# Patient Record
Sex: FEMALE | Race: WHITE
[De-identification: names, ages, dates, MRNs, and addresses within clinical notes are randomized per-mention and may not be internally consistent; named-entity substitution may affect disease eponyms.]

---

## 2017-02-14 ENCOUNTER — HOSPITAL ENCOUNTER (INPATIENT)
Dept: HOSPITAL 62 - ER | Age: 72
LOS: 1 days | Discharge: HOME | DRG: 192 | End: 2017-02-15
Attending: INTERNAL MEDICINE | Admitting: INTERNAL MEDICINE
Payer: MEDICARE

## 2017-02-14 DIAGNOSIS — F17.210: ICD-10-CM

## 2017-02-14 DIAGNOSIS — R09.02: ICD-10-CM

## 2017-02-14 DIAGNOSIS — J45.909: ICD-10-CM

## 2017-02-14 DIAGNOSIS — D50.9: ICD-10-CM

## 2017-02-14 DIAGNOSIS — Z90.710: ICD-10-CM

## 2017-02-14 DIAGNOSIS — D11.9: ICD-10-CM

## 2017-02-14 DIAGNOSIS — Z99.81: ICD-10-CM

## 2017-02-14 DIAGNOSIS — Z83.3: ICD-10-CM

## 2017-02-14 DIAGNOSIS — J44.1: Primary | ICD-10-CM

## 2017-02-14 DIAGNOSIS — Z88.1: ICD-10-CM

## 2017-02-14 DIAGNOSIS — Z90.49: ICD-10-CM

## 2017-02-14 DIAGNOSIS — M19.90: ICD-10-CM

## 2017-02-14 LAB
BASOPHILS # BLD AUTO: 0 10^3/UL (ref 0–0.2)
BASOPHILS NFR BLD AUTO: 0.4 % (ref 0–2)
EOSINOPHIL # BLD AUTO: 0.1 10^3/UL (ref 0–0.6)
EOSINOPHIL NFR BLD AUTO: 0.8 % (ref 0–6)
ERYTHROCYTE [DISTWIDTH] IN BLOOD BY AUTOMATED COUNT: 20.3 % (ref 11.5–14)
HCT VFR BLD CALC: 30.5 % (ref 36–47)
HGB BLD-MCNC: 9.4 G/DL (ref 12–15.5)
HGB HCT DIFFERENCE: -2.3
LYMPHOCYTES # BLD AUTO: 1.3 10^3/UL (ref 0.5–4.7)
LYMPHOCYTES NFR BLD AUTO: 15.3 % (ref 13–45)
MCH RBC QN AUTO: 21.2 PG (ref 27–33.4)
MCHC RBC AUTO-ENTMCNC: 30.9 G/DL (ref 32–36)
MCV RBC AUTO: 69 FL (ref 80–97)
MONOCYTES # BLD AUTO: 0.5 10^3/UL (ref 0.1–1.4)
MONOCYTES NFR BLD AUTO: 6.1 % (ref 3–13)
NEUTROPHILS # BLD AUTO: 6.6 10^3/UL (ref 1.7–8.2)
NEUTS SEG NFR BLD AUTO: 77.4 % (ref 42–78)
RBC # BLD AUTO: 4.45 10^6/UL (ref 3.72–5.28)
WBC # BLD AUTO: 8.5 10^3/UL (ref 4–10.5)

## 2017-02-14 PROCEDURE — 94660 CPAP INITIATION&MGMT: CPT

## 2017-02-14 PROCEDURE — 93010 ELECTROCARDIOGRAM REPORT: CPT

## 2017-02-14 PROCEDURE — 83880 ASSAY OF NATRIURETIC PEPTIDE: CPT

## 2017-02-14 PROCEDURE — 85045 AUTOMATED RETICULOCYTE COUNT: CPT

## 2017-02-14 PROCEDURE — 82728 ASSAY OF FERRITIN: CPT

## 2017-02-14 PROCEDURE — 80053 COMPREHEN METABOLIC PANEL: CPT

## 2017-02-14 PROCEDURE — 80048 BASIC METABOLIC PNL TOTAL CA: CPT

## 2017-02-14 PROCEDURE — 94799 UNLISTED PULMONARY SVC/PX: CPT

## 2017-02-14 PROCEDURE — 85610 PROTHROMBIN TIME: CPT

## 2017-02-14 PROCEDURE — 36415 COLL VENOUS BLD VENIPUNCTURE: CPT

## 2017-02-14 PROCEDURE — 71010: CPT

## 2017-02-14 PROCEDURE — 82803 BLOOD GASES ANY COMBINATION: CPT

## 2017-02-14 PROCEDURE — 94640 AIRWAY INHALATION TREATMENT: CPT

## 2017-02-14 PROCEDURE — 82962 GLUCOSE BLOOD TEST: CPT

## 2017-02-14 PROCEDURE — 83540 ASSAY OF IRON: CPT

## 2017-02-14 PROCEDURE — 87040 BLOOD CULTURE FOR BACTERIA: CPT

## 2017-02-14 PROCEDURE — 82607 VITAMIN B-12: CPT

## 2017-02-14 PROCEDURE — 93005 ELECTROCARDIOGRAM TRACING: CPT

## 2017-02-14 PROCEDURE — 87086 URINE CULTURE/COLONY COUNT: CPT

## 2017-02-14 PROCEDURE — 96365 THER/PROPH/DIAG IV INF INIT: CPT

## 2017-02-14 PROCEDURE — 83550 IRON BINDING TEST: CPT

## 2017-02-14 PROCEDURE — 83605 ASSAY OF LACTIC ACID: CPT

## 2017-02-14 PROCEDURE — 99291 CRITICAL CARE FIRST HOUR: CPT

## 2017-02-14 PROCEDURE — 82746 ASSAY OF FOLIC ACID SERUM: CPT

## 2017-02-14 PROCEDURE — 84484 ASSAY OF TROPONIN QUANT: CPT

## 2017-02-14 PROCEDURE — 81001 URINALYSIS AUTO W/SCOPE: CPT

## 2017-02-14 PROCEDURE — 85025 COMPLETE CBC W/AUTO DIFF WBC: CPT

## 2017-02-14 NOTE — EKG REPORT
SEVERITY:- ABNORMAL ECG -

SINUS RHYTHM

BORDERLINE LEFT AXIS DEVIATION

CONSIDER ANTEROSEPTAL INFARCT

BORDERLINE PROLONGED QT INTERVAL

:

Confirmed by: James Lopez 14-Feb-2017 23:48:21

## 2017-02-14 NOTE — ER DOCUMENT REPORT
ED Respiratory Problem





- General


Mode of Arrival: Medic


Information source: Patient, Emergency Med Personnel


TRAVEL OUTSIDE OF THE [place] IN LAST 30 DAYS: No





- HPI


Patient complains to provider of: Short of breath


Onset: Last week


Duration: Worse/persistent


Context: Hx COPD


Short of Breath: Mild


Associated symptoms: Fever, Sore Throat, Other - Nasal congestion


Similar symptoms previously: Yes


Recently seen / treated by doctor: Yes





<BRUCE CRESPO - Last Filed: 02/15/17 00:10>





<TERRY YOST - Last Filed: 02/15/17 05:23>





- General


Stated Complaint: DIFFICULTY BREATHING


Notes: 


Patient is a 71-year-old female that presents to the emergency department today 

with complaints of shortness of breath 1 week.  Patient went to an urgent care 

today for shortness of breath and was started on prednisone and azithromycin.  

Patient states her shortness of breath is much worse with exertion.  Patient 

states she is on 2L of home O2 daily.  EMS reports on arrival, the patient had 

an oxygen saturation 92%.  EMS administered 1 duoneb and 1 Solu-Medrol in 

route.  Patient states she had a "low-grade fever" at home along with nasal 

congestion and a sore throat. Patient is in mild respiratory distress. (BRUCE CRESPO)





- Related Data


Allergies/Adverse Reactions: 


 





ciprofloxacin [From Cipro] Allergy (Intermediate, Verified 02/15/17 00:27)


 


ciprofloxacin HCl [From Cipro] Allergy (Intermediate, Verified 02/15/17 00:27)


 


clindamycin [Clindamycin] Allergy (Intermediate, Verified 02/15/17 00:27)


 











Past Medical History





- General


Information source: Patient, [facility] Records





- Social History


Smoking Status: Current Every Day Smoker


Cigarette use (# per day): Yes


Frequency of alcohol use: None


Drug Abuse: None


Lives with: Family


Family History: Reviewed & Not Pertinent, DM


Pulmonary Medical History: Reports: Hx Asthma, Hx COPD, Hx Pneumonia


Endocrine Medical History: Reports: Hx Diabetes Mellitus Type 2


Musculoskeltal Medical History: Reports Hx Arthritis


Psychiatric Medical History: Reports: Hx Depression


Past Surgical History: Reports: Hx Abdominal Surgery - lap band, bowel resection

, Hx Appendectomy, Hx Breast Surgery - reduction, Hx Cholecystectomy, Hx 

Hysterectomy, Hx Thyroid Surgery - thyroidectomy





- Immunizations


Hx Diphtheria, Pertussis, Tetanus Vaccination: Yes - 09/01/13


Hx Pneumococcal Vaccination: 09/01/13





<BRUCE CRESPO - Last Filed: 02/15/17 00:10>





Review of Systems





- Review of Systems


Constitutional: See HPI, Fever


EENT: See HPI, Nose congestion, Throat pain


Cardiovascular: No symptoms reported


Respiratory: See HPI, Short of breath


Gastrointestinal: No symptoms reported


Genitourinary: No symptoms reported


Female Genitourinary: No symptoms reported


Musculoskeletal: No symptoms reported


Skin: No symptoms reported


Hematologic/Lymphatic: No symptoms reported


Neurological/Psychological: No symptoms reported


-: Yes All other systems reviewed and negative





<BRUCE CRESPO - Last Filed: 02/15/17 00:10>





Physical Exam





- Vital signs


Interpretation: Tachycardic





<BRUCE CRESPO - Last Filed: 02/15/17 00:10>





<TERRY YOST - Last Filed: 02/15/17 05:23>





- Vital signs


Vitals: 


 











Temp


 


 99.4 F 


 


 02/14/17 23:20











 (TERRY YOST)





- Notes


Notes: 


Physical Exam:


 


General: Alert, appears to be in mild distress secondary to shortness of breath.


 


HEENT: Normocephalic. Atraumatic. PERRL. Extraocular movements intact. 

Oropharynx clear.


 


Neck: Supple. Non-tender.


 


Respiratory: Mild respiratory distress.  Wheezing throughout.  Mild 

retractions.  Decreased breath sounds at the bases bilaterally.


 


Cardiovascular: Tachycardic, regular rhythm.


 


Abdominal: Normal Inspection. Non-tender. No distension. Normal Bowel Sounds. 


 


Back: Non-tender. No deformity or step off.


 


Extremities: Moves all four extremities.


Upper extremities: Normal inspection. Normal ROM.


Lower extremities: Normal inspection. No edema. Normal ROM. 


 


Neurological: Normal cognition. AAOx4. Normal speech.  


 


Psychological: Normal affect. Normal Mood. 


 


Skin: Warm. Dry. Normal color.  (BRUCE CRESPO)





Course





- Laboratory


Result Diagrams: 


 02/14/17 23:40





 02/14/17 23:40





<BRUCE CRESPO - Last Filed: 02/15/17 00:10>





- Laboratory


Result Diagrams: 


 02/14/17 23:40





 02/14/17 23:40





<TERRY YOST - Last Filed: 02/15/17 05:23>





- Re-evaluation


Re-evalutation: 





02/15/17 


Patient with COPD exacerbation, hypoxia, a right lower lobe pneumonia.  

Continues to wheeze despite DuoNeb, Solu-Medrol, and magnesium.  Has failed 

outpatient treatment with azithromycin.  Allergy to Levaquin.  Patient given 

cefepime and doxycycline.  Blood culture sent.  Patient will be admitted to the 

hospital service.  Discussed with daughter who agrees with this plan.  Stable 

time of admission. (TERRY YOST)





- Vital Signs


Vital signs: 


 











Temp Pulse Resp BP Pulse Ox


 


 98.0 F   96   25 H  114/65   95 


 


 02/15/17 04:04  02/15/17 04:04  02/15/17 04:04  02/15/17 04:04  02/15/17 04:04











 (TERRY YOST)





- Laboratory


Laboratory results interpreted by me: 


 











  02/14/17 02/14/17 02/14/17





  23:40 23:40 23:40


 


Hgb  9.4 L  


 


Hct  30.5 L  


 


MCV  69 L  


 


MCH  21.2 L  


 


MCHC  30.9 L  


 


RDW  20.3 H  


 


Carbon Dioxide   32 H 


 


Glucose   113 H 


 


POC Glucose   


 


Iron    < 10 L


 


Ferritin    10.60 L














  02/15/17





  00:46


 


Hgb 


 


Hct 


 


MCV 


 


MCH 


 


MCHC 


 


RDW 


 


Carbon Dioxide 


 


Glucose 


 


POC Glucose  186 H


 


Iron 


 


Ferritin 











 (TERRY YOST)





Critical Care Note





- Critical Care Note


Total time excluding time spent on procedures (mins): 45 - evaluation and 

management of respiratory distress, multiple re-evaluations, hypoxia, pneumonia

, counseling of patient, coordination of admission





<TERRY YOST - Last Filed: 02/15/17 05:23>





Discharge





<BRUCE CRESPO - Last Filed: 02/15/17 00:10>





- Discharge


Admitting Provider: Hospitalist


Unit Admitted: IMCU





<TERRY YOST - Last Filed: 02/15/17 05:23>





- Discharge


Clinical Impression: 


 COPD exacerbation, Hypoxia, Possible pneumonia





Condition: Stable


Disposition: ADMITTED AS INPATIENT


Scribe Attestation: 





02/15/17 05:22


I personally performed the services described in the documentation, reviewed 

and edited the documentation which was dictated to the scribe in my presence, 

and it accurately records my words and actions. (TERRY YOST)





Scribe Documentation





- Scribe


Written by Scribe:: Rodolfo Mahajan, 2/15/2017 0009


acting as scribe for :: Daniel





<BRUCE CRESPO - Last Filed: 02/15/17 00:10>

## 2017-02-15 VITALS — DIASTOLIC BLOOD PRESSURE: 61 MMHG | SYSTOLIC BLOOD PRESSURE: 127 MMHG

## 2017-02-15 LAB
ALBUMIN SERPL-MCNC: 3.5 G/DL (ref 3.5–5)
ALP SERPL-CCNC: 66 U/L (ref 38–126)
ALT SERPL-CCNC: 14 U/L (ref 9–52)
ANION GAP SERPL CALC-SCNC: 11 MMOL/L (ref 5–19)
ANION GAP SERPL CALC-SCNC: 6 MMOL/L (ref 5–19)
ANISOCYTOSIS BLD QL SMEAR: (no result)
APPEARANCE UR: CLEAR
AST SERPL-CCNC: 15 U/L (ref 14–36)
BASE EXCESS BLDV CALC-SCNC: 3.3 MMOL/L
BASOPHILS NFR BLD MANUAL: 0 % (ref 0–2)
BILIRUB DIRECT SERPL-MCNC: 0 MG/DL (ref 0–0.3)
BILIRUB SERPL-MCNC: 0.4 MG/DL (ref 0.2–1.3)
BILIRUB UR QL STRIP: NEGATIVE
BUN SERPL-MCNC: 6 MG/DL (ref 7–20)
BUN SERPL-MCNC: 8 MG/DL (ref 7–20)
CALCIUM: 8.5 MG/DL (ref 8.4–10.2)
CALCIUM: 8.9 MG/DL (ref 8.4–10.2)
CHLORIDE SERPL-SCNC: 104 MMOL/L (ref 98–107)
CHLORIDE SERPL-SCNC: 106 MMOL/L (ref 98–107)
CO2 SERPL-SCNC: 27 MMOL/L (ref 22–30)
CO2 SERPL-SCNC: 32 MMOL/L (ref 22–30)
CREAT SERPL-MCNC: 0.45 MG/DL (ref 0.52–1.25)
CREAT SERPL-MCNC: 0.53 MG/DL (ref 0.52–1.25)
EOSINOPHIL NFR BLD MANUAL: 0 % (ref 0–6)
ERYTHROCYTE [DISTWIDTH] IN BLOOD BY AUTOMATED COUNT: 19.9 % (ref 11.5–14)
FERRITIN SERPL-MCNC: 10.6 NG/ML (ref 11.1–264)
FOLATE SERPL-MCNC: 7.55 NG/ML (ref 2.76–?)
GLUCOSE SERPL-MCNC: 113 MG/DL (ref 75–110)
GLUCOSE SERPL-MCNC: 176 MG/DL (ref 75–110)
GLUCOSE UR STRIP-MCNC: NEGATIVE MG/DL
HCO3 BLDV-SCNC: 29.7 MMOL/L (ref 20–32)
HCT VFR BLD CALC: 32.4 % (ref 36–47)
HGB BLD-MCNC: 9.8 G/DL (ref 12–15.5)
HGB HCT DIFFERENCE: -3
KETONES UR STRIP-MCNC: NEGATIVE MG/DL
MCH RBC QN AUTO: 20.8 PG (ref 27–33.4)
MCHC RBC AUTO-ENTMCNC: 30.3 G/DL (ref 32–36)
MCV RBC AUTO: 69 FL (ref 80–97)
MICROCYTES BLD QL SMEAR: (no result)
NITRITE UR QL STRIP: NEGATIVE
OVALOCYTES BLD QL SMEAR: SLIGHT
PCO2 BLDV: 54.1 MMHG (ref 35–63)
PH BLDV: 7.36 [PH] (ref 7.3–7.42)
PH UR STRIP: 6 [PH] (ref 5–9)
POIKILOCYTOSIS BLD QL SMEAR: SLIGHT
POTASSIUM SERPL-SCNC: 3.8 MMOL/L (ref 3.6–5)
POTASSIUM SERPL-SCNC: 3.8 MMOL/L (ref 3.6–5)
PROT SERPL-MCNC: 6.4 G/DL (ref 6.3–8.2)
PROT UR STRIP-MCNC: NEGATIVE MG/DL
PROTHROMBIN TIME: 14.3 SEC (ref 11.4–15.4)
RBC # BLD AUTO: 4.71 10^6/UL (ref 3.72–5.28)
SODIUM SERPL-SCNC: 142 MMOL/L (ref 137–145)
SODIUM SERPL-SCNC: 144.2 MMOL/L (ref 137–145)
SP GR UR STRIP: 1.01
TOTAL CELLS COUNTED BLD: 100
TOXIC GRANULES BLD QL SMEAR: SLIGHT
UROBILINOGEN UR-MCNC: NEGATIVE MG/DL (ref ?–2)
VARIANT LYMPHS NFR BLD MANUAL: 2 % (ref 13–45)
VIT B12 SERPL-MCNC: 259 PG/ML (ref 239–931)
WBC # BLD AUTO: 8.2 10^3/UL (ref 4–10.5)

## 2017-02-15 PROCEDURE — 5A09357 ASSISTANCE WITH RESPIRATORY VENTILATION, LESS THAN 24 CONSECUTIVE HOURS, CONTINUOUS POSITIVE AIRWAY PRESSURE: ICD-10-PCS | Performed by: INTERNAL MEDICINE

## 2017-02-15 RX ADMIN — MAGNESIUM SULFATE IN DEXTROSE SCH ML: 10 INJECTION, SOLUTION INTRAVENOUS at 00:02

## 2017-02-15 RX ADMIN — IPRATROPIUM BROMIDE AND ALBUTEROL SULFATE SCH ML: 2.5; .5 SOLUTION RESPIRATORY (INHALATION) at 08:27

## 2017-02-15 RX ADMIN — IPRATROPIUM BROMIDE AND ALBUTEROL SULFATE SCH ML: 2.5; .5 SOLUTION RESPIRATORY (INHALATION) at 13:31

## 2017-02-15 RX ADMIN — MAGNESIUM SULFATE IN DEXTROSE SCH ML: 10 INJECTION, SOLUTION INTRAVENOUS at 00:42

## 2017-02-15 NOTE — PDOC H&P
History of Present Illness


Admission Date/PCP: 


  02/15/17 02:02





  





Patient complains of: Shortness of breath and cough


History of Present Illness: 


NATE PERES is a 71 year old female with a past medical history of COPD and 

home oxygen dependence, who had been her usual state of health until 

approximately 24 hours prior to presentation recently returning from vacation 

and concerned for exposure to ill contacts developing pharyngitis and a 

nonproductive cough she sought evaluation with primary care who initiated 

azithromycin of the last 3 days without significant improvement prompting his 

seek evaluation emergency room where she's found to have a COPD exacerbation 

and referred to the hospitalist for admission.


  





Past Medical History


Cardiac Medical History: 


   Denies: Coronary Artery Disease, Myocardial Infarction, Hypertension


Pulmonary Medical History: Reports: Asthma, Chronic Obstructive Pulmonary 

Disease (COPD), Pneumonia


   Denies: Bronchitis


Neurological Medical History: 


   Denies: Seizures


Endocrine Medical History: Reports: Diabetes Mellitus Type 2


Musculoskeltal Medical History: Reports: Arthritis


Psychiatric Medical History: Reports: Depression, Tobacco Dependency


Hematology: 


   Denies: Anemia





Past Surgical History


Past Surgical History: Reports: Appendectomy, Cholecystectomy, Hysterectomy





Social History


Lives with: Family


Smoking Status: Current Every Day Smoker


Cigarettes Packs Per Day: 1


Drugs: None





- Advance Directive


Resuscitation Status: Full Code





Family History


Family History: Reviewed & Not Pertinent, COPD, DM


Parental Family History Reviewed: Yes


Children Family History Reviewed: Yes


Sibling(s) Family History Reviewed.: Yes





Medication/Allergy


Home Medications: 








Levothyroxine Sodium [Synthroid 150 Mcg Tablet] 150 mcg PO DAILY 10/25/13 


Trazodone HCl [Desyrel] 300 mg PO DAILY 10/25/13 


Azithromycin [Zithromax 250 mg Tablet] 250 mg PO ASDIR PRN #6 tablet 11/05/16 


Prednisone [Deltasone 20 mg Tablet] 2 tab PO DAILY 4 Days 11/05/16 








Allergies/Adverse Reactions: 


 





ciprofloxacin [From Cipro] Allergy (Intermediate, Verified 02/15/17 00:27)


 


ciprofloxacin HCl [From Cipro] Allergy (Intermediate, Verified 02/15/17 00:27)


 


clindamycin [Clindamycin] Allergy (Intermediate, Verified 02/15/17 00:27)


 











Physical Exam


Vital Signs: 


 











Temp Pulse Resp BP Pulse Ox


 


 98.0 F   96   25 H  114/65   95 


 


 02/15/17 04:04  02/15/17 04:04  02/15/17 04:04  02/15/17 04:04  02/15/17 04:04














Results


Impressions: 


 





Chest X-Ray  02/15/17 00:00


IMPRESSION:  No acute radiographic finding in the chest.


 














Assessment & Plan





- Diagnosis


(1) Acute exacerbation of chronic bronchitis


Is this a current diagnosis for this admission?: YesPlan: 


Empiric antibiotics symptomatic management consider repeat chest imaging.








(2) COPD exacerbation


Is this a current diagnosis for this admission?: YesPlan: 


Observed on a monitored bed albuterol Atrovent nebulizer, prednisone, incentive 

spirometry, ambulation.











- Time


Time Spent: 30 to 50 Minutes

## 2017-02-15 NOTE — PDOC DISCHARGE SUMMARY
General





- Admit/Disc Date/PCP


Admission Date/Primary Care Provider: 


  02/15/17 02:02





  





Discharge Date: 02/15/17





- Additional Information


Resuscitation Status: Full Code


Discharge Activity: Activity As Tolerated


Home Medications: 








Acetaminophen [Tylenol 325 mg Tablet] 650 mg PO Q4HP PRN  tablet 02/15/17 


Budesonide/Formoterol Fumarate [Symbicort -4.5 mcg Inhaler 6 gm] 2 puff 

IH Q12 #1 inhaler 02/15/17 


Doxycycline Hyclate [Vibramycin 100 mg Tablet] 100 mg PO Q12 #20 tablet 02/15/

17 


Guaifenesin [Mucinex Sr 600 mg Tablet.sa] 600 mg PO Q12  tablet.sa 02/15/17 


Iron Polysaccharides Complex [Nu-Iron 150 Capsule] 150 mg PO DAILY #30 capsule 

02/15/17 


Levothyroxine Sodium [Synthroid 0.075 mg Tablet] 0.075 mg PO QHS  tablet 02/15/

17 


Levothyroxine Sodium [Synthroid] 175 mcg PO QHS 02/15/17 


Metformin HCl [Glucophage] 500 mg PO BID #1 tablet 02/15/17 


Sertraline HCl [Zoloft 50 mg Tablet] 50 mg PO DAILY  tablet 02/15/17 


Trazodone HCl [Desyrel] 300 mg PO QHS 02/15/17 











History of Present Illness


Patient complains of: Increasing shortness of breath, cough and wheezing


History of Present Illness: 


NATE PERES is a 71 year old female who presents to [facility]'s 

ER, with complaints of increasing dyspnea and shortness of breath.  She states 

she was in her usual state of health until approximately 24 hours prior to her 

arrival.  She had just recently returned from a vacation in [place] and 

was concerned for exposure to ill contacts.  She had is sinus drainage sore 

throat and low-grade fever.  She was seen yesterday by her primary care 

provider and prescribed Z-Justin and prednisone taper.  She had taken 1 dose of 

the azithromycin and 1 dose of prednisone before her arrival here.  She was 

found to be saturating 90% on 2 L she does wear oxygen 24 7.  She normally 

wears 2 L/m.  She has had a productive cough which has worsened over the period 

of the day.  She does have some expiratory wheezing as well.  She was given 

nebulizer treatments and referred to the hospitalist for admission.








Hospital Course


Hospital Course: 


The patient was referred to the hospitalist service for admission.  She is 

placed on CPAP overnight.  This morning she feels much improved, she is no 

longer wheezing or dyspnea.  She is asking to be discharged home.  We had the 

nurse ambulate her with her oxygen on at 2 L her oxygen saturation stayed 

greater than 93%.  She did not become tachycardic.  Therefore we'll discharge 

her home.  We have added doxycycline 100 mg twice a day for the next 10 days.  

Complete her azithromycin and prednisone taper. We also added symbicort inhaler 

bid . 





Physical Exam


Vital Signs: 


 











Temp Pulse Resp BP Pulse Ox


 


 97.9 F   85   16   127/61 H  98 


 


 02/15/17 14:45  02/15/17 14:45  02/15/17 14:45  02/15/17 14:45  02/15/17 14:45








 Intake & Output











 02/14/17 02/15/17 02/16/17





 06:59 06:59 06:59


 


Intake Total  450 


 


Output Total  550 


 


Balance  -100 


 


Weight  68.3 kg 











General appearance: PRESENT: no acute distress, well-developed, well-nourished


Head exam: PRESENT: atraumatic, normocephalic


Eye exam: PRESENT: conjunctiva pink, EOMI, PERRLA.  ABSENT: scleral icterus


Ear exam: PRESENT: normal external ear exam


Mouth exam: PRESENT: moist, neck supple, tongue midline


Throat exam: PRESENT: post pharyngeal erythema


Respiratory exam: PRESENT: clear to auscultation mackenzie.  ABSENT: rales, rhonchi, 

wheezes


Cardiovascular exam: PRESENT: RRR.  ABSENT: diastolic murmur, rubs, systolic 

murmur


Pulses: PRESENT: normal dorsalis pedis pul


Vascular exam: PRESENT: normal capillary refill


GI/Abdominal exam: PRESENT: normal bowel sounds, soft.  ABSENT: distended, 

guarding, mass, organolmegaly, rebound, tenderness


Rectal exam: PRESENT: deferred


Extremities exam: PRESENT: full ROM.  ABSENT: calf tenderness, clubbing, pedal 

edema


Neurological exam: PRESENT: alert, awake, oriented to person, oriented to place

, oriented to time, oriented to situation, CN II-XII grossly intact.  ABSENT: 

motor sensory deficit


Psychiatric exam: PRESENT: appropriate affect, normal mood.  ABSENT: homicidal 

ideation, suicidal ideation


Skin exam: PRESENT: dry, intact, warm.  ABSENT: cyanosis, rash





Results


Laboratory Results: 


 





 02/15/17 06:33 





 02/15/17 06:33 





 











  02/15/17 02/15/17





  06:33 06:33


 


WBC  8.2 


 


RBC  4.71 


 


Hgb  9.8 L 


 


Hct  32.4 L 


 


MCV  69 L 


 


MCH  20.8 L 


 


MCHC  30.3 L 


 


RDW  19.9 H 


 


Plt Count  304 


 


Seg Neutrophils %  Not Reportable 


 


Lymphocytes %  Not Reportable 


 


Monocytes %  Not Reportable 


 


Eosinophils %  Not Reportable 


 


Basophils %  Not Reportable 


 


Absolute Neutrophils  Not Reportable 


 


Absolute Lymphocytes  Not Reportable 


 


Absolute Monocytes  Not Reportable 


 


Absolute Eosinophils  Not Reportable 


 


Absolute Basophils  Not Reportable 


 


Sodium   144.2


 


Potassium   3.8


 


Chloride   106


 


Carbon Dioxide   27


 


Anion Gap   11


 


BUN   6 L


 


Creatinine   0.45 L


 


Est GFR ( Amer)   > 60


 


Est GFR (Non-Af Amer)   > 60


 


Glucose   176 H


 


Calcium   8.9











Impressions: 


 





Chest X-Ray  02/15/17 00:00


IMPRESSION:  No acute radiographic finding in the chest.


 














Qualifiers


**PATEINT BEING DISCHARGED WITH ANY OF THE FOLLOWING DIAGNOSIS?: No





Plan


Discharge Plan: 


Discharge home with family


Time Spent: Less than 30 Minutes

## 2017-05-08 ENCOUNTER — HOSPITAL ENCOUNTER (OUTPATIENT)
Dept: HOSPITAL 62 - END | Age: 72
Discharge: HOME | End: 2017-05-08
Attending: INTERNAL MEDICINE
Payer: MEDICARE

## 2017-05-08 VITALS — SYSTOLIC BLOOD PRESSURE: 108 MMHG | DIASTOLIC BLOOD PRESSURE: 61 MMHG

## 2017-05-08 DIAGNOSIS — K22.9: ICD-10-CM

## 2017-05-08 DIAGNOSIS — K29.70: ICD-10-CM

## 2017-05-08 DIAGNOSIS — Z79.899: ICD-10-CM

## 2017-05-08 DIAGNOSIS — E11.9: ICD-10-CM

## 2017-05-08 DIAGNOSIS — Z79.84: ICD-10-CM

## 2017-05-08 DIAGNOSIS — Z79.51: ICD-10-CM

## 2017-05-08 DIAGNOSIS — Z88.1: ICD-10-CM

## 2017-05-08 DIAGNOSIS — E78.5: ICD-10-CM

## 2017-05-08 DIAGNOSIS — K64.8: ICD-10-CM

## 2017-05-08 DIAGNOSIS — Q27.30: ICD-10-CM

## 2017-05-08 DIAGNOSIS — D50.0: ICD-10-CM

## 2017-05-08 DIAGNOSIS — Z79.1: ICD-10-CM

## 2017-05-08 DIAGNOSIS — K21.9: ICD-10-CM

## 2017-05-08 DIAGNOSIS — K52.9: Primary | ICD-10-CM

## 2017-05-08 DIAGNOSIS — E89.0: ICD-10-CM

## 2017-05-08 DIAGNOSIS — K57.30: ICD-10-CM

## 2017-05-08 DIAGNOSIS — J44.9: ICD-10-CM

## 2017-05-08 DIAGNOSIS — Z90.49: ICD-10-CM

## 2017-05-08 DIAGNOSIS — F17.210: ICD-10-CM

## 2017-05-08 DIAGNOSIS — Z98.84: ICD-10-CM

## 2017-05-08 PROCEDURE — 45380 COLONOSCOPY AND BIOPSY: CPT

## 2017-05-08 PROCEDURE — 82962 GLUCOSE BLOOD TEST: CPT

## 2017-05-08 PROCEDURE — 43239 EGD BIOPSY SINGLE/MULTIPLE: CPT

## 2017-05-08 PROCEDURE — 0DB58ZX EXCISION OF ESOPHAGUS, VIA NATURAL OR ARTIFICIAL OPENING ENDOSCOPIC, DIAGNOSTIC: ICD-10-PCS | Performed by: INTERNAL MEDICINE

## 2017-05-08 PROCEDURE — 88305 TISSUE EXAM BY PATHOLOGIST: CPT

## 2017-05-08 PROCEDURE — 0DBE8ZX EXCISION OF LARGE INTESTINE, VIA NATURAL OR ARTIFICIAL OPENING ENDOSCOPIC, DIAGNOSTIC: ICD-10-PCS | Performed by: INTERNAL MEDICINE

## 2017-05-08 PROCEDURE — 0DB68ZX EXCISION OF STOMACH, VIA NATURAL OR ARTIFICIAL OPENING ENDOSCOPIC, DIAGNOSTIC: ICD-10-PCS | Performed by: INTERNAL MEDICINE

## 2017-05-08 NOTE — OPERATIVE REPORT
Operative Report


DATE OF SURGERY: 05/08/17


Operative Report: 


The risks, benefits and alternatives of the procedure including risks of 

bleeding, perforation requiring surgery are explained to the patient detail and 

informed consent is obtained.  Patient is taken back to the endoscopy suite and 

placed in a left, lateral decubital position.  Timeout is called.  Propofol 

medications administered.  A rectal examination is done which did not reveal 

any masses, tears or fissures.  An Olympus videoscope was inserted into the 

patient's rectum.  The scope was then gradually advanced all the way to the 

cecum.  The cecum was identified by the usual anatomical landmarks including 

the ileocecal valve as well as the appendiceal office.  Photodocumentation is 

obtained.  Prep is not good.  This an oily residue throughout the colon.  

Visualization is largely poor.  The scope was then sequentially pulled back via 

the rest segments of the colon including the ascending colon, hepatic flexure, 

transverse colon, splenic flexure, descending colon and finally into the 

rectosigmoid portions of the colon.  Retroflexion maneuvers performed.


The risks benefits and alternatives of the procedure explained to the patient 

in detail and informed consent is obtained that GIF Olympus video scope was 

inserted into the patient's mouth and hypopharynx the esophagus is identified 

intubated and insufflated the scope was then advanced through the esophagus 

stomach and duodenum retroflexion maneuver is done the esophagus stomach and 

first and second portions of the duodenum examined


PREOPERATIVE DIAGNOSIS: Possible history of AVM.  Iron deficiency anemia, rule 

out GI bleed.  History of polyps


POSTOPERATIVE DIAGNOSIS: Poor visualization in the colon.  Diverticulosis.  

Internal hemorrhoids.  Right-sided inflammation status post biopsy.  Possible 

Whiting's esophagus.  Gastritis, biopsy rule out Helicobacter pylori


OPERATION: Colonoscopy with biopsy.  EGD with biopsy


SURGEON: THELMA MARTIN


ANESTHESIA: LMAC


TISSUE REMOVED OR ALTERED: Right-sided colon specimens obtained.  Gastric 

specimens obtained rule out Helicobacter pylori.  Esophageal specimens obtained 

rule out Whiting's esophagus.  Did not see any AVMs that were present.


COMPLICATIONS: 


None.


ESTIMATED BLOOD LOSS: none.


INTRAOPERATIVE FINDINGS: As described above.


PROCEDURE: 


Patient tolerated procedure well.


No immediate postprocedure complications are noted.


Patient discharged in good condition.


Discharge date 05/08/2017.


Discharge diet: Regular.


Discharge activity: Regular.


We'll await on pathology.


2-3 week follow-up to discuss findings.


Patient is instructed to call the office or proceed to the emergency room 

should there be any further polyps or questions.


I would recommend follow-up surveillance colonoscopy next year with alternative 

prep for repeat evaluation given the poor visualization during this 

colonoscopy.  That may have to be done earlier if iron deficiency anemia 

persists.

## 2017-05-23 ENCOUNTER — HOSPITAL ENCOUNTER (OUTPATIENT)
Dept: HOSPITAL 62 - END | Age: 72
Discharge: HOME | End: 2017-05-23
Attending: INTERNAL MEDICINE
Payer: MEDICARE

## 2017-05-23 VITALS — SYSTOLIC BLOOD PRESSURE: 118 MMHG | DIASTOLIC BLOOD PRESSURE: 57 MMHG

## 2017-05-23 DIAGNOSIS — Z98.84: ICD-10-CM

## 2017-05-23 DIAGNOSIS — F41.1: ICD-10-CM

## 2017-05-23 DIAGNOSIS — E11.9: ICD-10-CM

## 2017-05-23 DIAGNOSIS — Z88.1: ICD-10-CM

## 2017-05-23 DIAGNOSIS — E78.5: ICD-10-CM

## 2017-05-23 DIAGNOSIS — F17.210: ICD-10-CM

## 2017-05-23 DIAGNOSIS — Z79.51: ICD-10-CM

## 2017-05-23 DIAGNOSIS — Z79.84: ICD-10-CM

## 2017-05-23 DIAGNOSIS — K22.719: Primary | ICD-10-CM

## 2017-05-23 DIAGNOSIS — F32.4: ICD-10-CM

## 2017-05-23 DIAGNOSIS — J44.9: ICD-10-CM

## 2017-05-23 DIAGNOSIS — Z79.4: ICD-10-CM

## 2017-05-23 DIAGNOSIS — Z79.1: ICD-10-CM

## 2017-05-23 DIAGNOSIS — Z79.899: ICD-10-CM

## 2017-05-23 DIAGNOSIS — K21.9: ICD-10-CM

## 2017-05-23 DIAGNOSIS — Z90.49: ICD-10-CM

## 2017-05-23 DIAGNOSIS — E89.0: ICD-10-CM

## 2017-05-23 PROCEDURE — 82962 GLUCOSE BLOOD TEST: CPT

## 2017-05-23 PROCEDURE — 43270 EGD LESION ABLATION: CPT

## 2017-05-23 PROCEDURE — 0D558ZZ DESTRUCTION OF ESOPHAGUS, VIA NATURAL OR ARTIFICIAL OPENING ENDOSCOPIC: ICD-10-PCS | Performed by: INTERNAL MEDICINE

## 2017-05-23 RX ADMIN — MIDAZOLAM HYDROCHLORIDE ONE MG: 1 INJECTION, SOLUTION INTRAMUSCULAR; INTRAVENOUS at 12:33

## 2017-05-23 RX ADMIN — FENTANYL CITRATE ONE MCG: 50 INJECTION INTRAMUSCULAR; INTRAVENOUS at 12:41

## 2017-05-23 RX ADMIN — FENTANYL CITRATE ONE MCG: 50 INJECTION INTRAMUSCULAR; INTRAVENOUS at 12:35

## 2017-05-23 RX ADMIN — FENTANYL CITRATE ONE MCG: 50 INJECTION INTRAMUSCULAR; INTRAVENOUS at 12:37

## 2017-05-23 RX ADMIN — MIDAZOLAM HYDROCHLORIDE ONE MG: 1 INJECTION, SOLUTION INTRAMUSCULAR; INTRAVENOUS at 12:38

## 2017-05-23 NOTE — OPERATIVE REPORT
Operative Report


DATE OF SURGERY: 05/23/17


Operative Report: 


The risks benefits and alternatives of the procedure explained to the patient 

in detail and informed consent is obtained. A GIF Olympus video scope was 

inserted into the patient's mouth and hypopharynx ,the esophagus is identified 

intubated and insufflated , the scope was then advanced through the esophagus 

stomach and duodenum, retroflexion maneuver is done ,the esophagus stomach and 

first and second portions of the duodenum examined


PREOPERATIVE DIAGNOSIS: Whiting's esophagus


POSTOPERATIVE DIAGNOSIS: There is esophagus status post ablation


OPERATION: EGD with the patient


SURGEON: THELMA MARTIN


ANESTHESIA: Moderate Sedation - 4 mg of Versed, 100 mcg of fentanyl.  Conscious 

sedation monitoring time 30 minutes.


TISSUE REMOVED OR ALTERED: None.


COMPLICATIONS: 


None.


ESTIMATED BLOOD LOSS: None.


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 

device.  Gastritis improved


PROCEDURE: 


Patient tolerated the procedure well.


No immediate postprocedure complications are noted.


Patient discharged in good condition.


Discharge date 5/23/2017.


Discharge diet: Regular.


Discharge activity: Regular.


2-3 week follow-up to discuss findings.


Patient is instructed to call the office or proceed to the emergency room 

should there be any further problems or questions.


We will wait on biopsies.

## 2019-06-06 ENCOUNTER — HOSPITAL ENCOUNTER (OUTPATIENT)
Dept: HOSPITAL 62 - SC | Age: 74
Discharge: HOME | End: 2019-06-06
Attending: OPHTHALMOLOGY
Payer: MEDICARE

## 2019-06-06 DIAGNOSIS — H57.03: ICD-10-CM

## 2019-06-06 DIAGNOSIS — Z79.899: ICD-10-CM

## 2019-06-06 DIAGNOSIS — H25.13: Primary | ICD-10-CM

## 2019-06-06 DIAGNOSIS — E07.9: ICD-10-CM

## 2019-06-06 PROCEDURE — V2788 PRESBYOPIA-CORRECT FUNCTION: HCPCS

## 2019-06-06 PROCEDURE — 82962 GLUCOSE BLOOD TEST: CPT

## 2019-06-06 PROCEDURE — 66984 XCAPSL CTRC RMVL W/O ECP: CPT

## 2019-06-06 RX ADMIN — TETRACAINE HYDROCHLORIDE PRN DROP: 5 SOLUTION OPHTHALMIC at 00:00

## 2019-06-06 RX ADMIN — BESIFLOXACIN PRN DROP: 6 SUSPENSION OPHTHALMIC at 11:26

## 2019-06-06 RX ADMIN — EPINEPHRINE ONE MG: 1 INJECTION, SOLUTION, CONCENTRATE INTRAVENOUS at 00:00

## 2019-06-06 RX ADMIN — BESIFLOXACIN PRN DROP: 6 SUSPENSION OPHTHALMIC at 11:51

## 2019-06-06 RX ADMIN — SODIUM CHONDROITIN SULFATE / SODIUM HYALURONATE ONE EACH: 0.55-0.5 INJECTION INTRAOCULAR at 12:11

## 2019-06-06 RX ADMIN — CYCLOPENTOLATE HYDROCHLORIDE AND PHENYLEPHRINE HYDROCHLORIDE PRN DROP: 2; 10 SOLUTION/ DROPS OPHTHALMIC at 11:35

## 2019-06-06 RX ADMIN — BESIFLOXACIN PRN DROP: 6 SUSPENSION OPHTHALMIC at 12:25

## 2019-06-06 RX ADMIN — TETRACAINE HYDROCHLORIDE PRN DROP: 5 SOLUTION OPHTHALMIC at 11:25

## 2019-06-06 RX ADMIN — EPINEPHRINE ONE MG: 1 INJECTION, SOLUTION, CONCENTRATE INTRAVENOUS at 12:11

## 2019-06-06 RX ADMIN — Medication ONE ML: at 12:11

## 2019-06-06 RX ADMIN — SODIUM CHONDROITIN SULFATE / SODIUM HYALURONATE ONE EACH: 0.55-0.5 INJECTION INTRAOCULAR at 00:00

## 2019-06-06 RX ADMIN — TETRACAINE HYDROCHLORIDE PRN DROP: 5 SOLUTION OPHTHALMIC at 11:57

## 2019-06-06 RX ADMIN — TROPICAMIDE PRN DROP: 10 SOLUTION/ DROPS OPHTHALMIC at 11:35

## 2019-06-06 RX ADMIN — DORZOLAMIDE HYDROCHLORIDE AND TIMOLOL MALEATE PRN DROP: 20; 5 SOLUTION OPHTHALMIC at 00:00

## 2019-06-06 RX ADMIN — Medication ONE ML: at 00:00

## 2019-06-06 RX ADMIN — TROPICAMIDE PRN DROP: 10 SOLUTION/ DROPS OPHTHALMIC at 11:25

## 2019-06-06 RX ADMIN — DORZOLAMIDE HYDROCHLORIDE AND TIMOLOL MALEATE PRN DROP: 20; 5 SOLUTION OPHTHALMIC at 12:25

## 2019-06-06 RX ADMIN — CYCLOPENTOLATE HYDROCHLORIDE AND PHENYLEPHRINE HYDROCHLORIDE PRN DROP: 2; 10 SOLUTION/ DROPS OPHTHALMIC at 11:50

## 2019-06-06 RX ADMIN — TETRACAINE HYDROCHLORIDE PRN DROP: 5 SOLUTION OPHTHALMIC at 11:51

## 2019-06-06 RX ADMIN — TROPICAMIDE PRN DROP: 10 SOLUTION/ DROPS OPHTHALMIC at 11:50

## 2019-06-06 RX ADMIN — CYCLOPENTOLATE HYDROCHLORIDE AND PHENYLEPHRINE HYDROCHLORIDE PRN DROP: 2; 10 SOLUTION/ DROPS OPHTHALMIC at 11:26

## 2019-06-06 RX ADMIN — BESIFLOXACIN PRN DROP: 6 SUSPENSION OPHTHALMIC at 00:00
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
## 2019-06-06 NOTE — [facility] OPERATIVE REPORT E
[facility] Operative Report



NAME: NATE PERES

                                      MRN: [id number]

                             AGE: 73Y

DATE OF SURGERY: 06/06/2019                   ROOM:



PREOPERATIVE DIAGNOSIS:

CATARACT LEFT EYE.



POSTOPERATIVE DIAGNOSIS:

CATARACT LEFT EYE.



OPERATION:

Cataract extraction with Symfony toric intraocular lens implant of the left

eye with a toric multifocal lens.



SURGEON:

JOSÉ MIGUEL PALMER M.D.



ANESTHESIA:

Topical.



COMPLICATIONS:

None.



ESTIMATED BLOOD LOSS:

None.



PROCEDURE:

After appropriate consent was obtained and calculations made, the patient

was brought back to the operating room where the patient was prepped and

draped in sterile fashion.  A lid speculum was placed and attention was

directed to a paracentesis where a paracentesis blade made a small

incision.  Viscoelastic was then used to inflate the anterior chamber. 

Next a 2.4 mm incision was made with the paracentesis blade.  A continuous

capsulorhexis forceps of approximately 5 mm was done using a cystitome and

capsulorhexis forceps.  Hydrodissection was carried out to make the lens

freely mobile and then a divide and conquer technique was used to remove

the lens with a CDE of approximately 10.64.  Following this, the remaining

cortical material was removed with irrigation/aspiration.  After this the

patient was then again marked.  The marking procedure started in the

preoperative holding area where 180 and 0 was marked with a marker. Now

that the patient was in the operating room a 360-degree marker was used to

lamberto the axis at approximately 110 degrees and a toric lens of 21.0

diopters [id number] was injected into the bag after filling with viscoelastic

and rotated to 10 degrees.  The I/A was used to remove the viscoelastic

material and the toric lens appeared to be appropriately aligned. Besivance 

and cosopt were instilled into the eye.

The patient returned to postoperative recovery in stable

condition.



 





DICTATING PHYSICIAN: JOSÉ MIGUEL PALMER M.D.



1217M              DT: 06/06/2019 2025

PHY#: [id number]         DD: 06/06/2019 1800

ID:   [id number]               JOB#: [id number]       ACCT: [id number]



cc:JOSÉ MIGUEL PALMER M.D.

>







MTDD

## 2019-07-11 ENCOUNTER — HOSPITAL ENCOUNTER (OUTPATIENT)
Dept: HOSPITAL 62 - SC | Age: 74
Discharge: HOME | End: 2019-07-11
Attending: OPHTHALMOLOGY
Payer: MEDICARE

## 2019-07-11 DIAGNOSIS — J44.9: ICD-10-CM

## 2019-07-11 DIAGNOSIS — Z96.1: ICD-10-CM

## 2019-07-11 DIAGNOSIS — H25.11: Primary | ICD-10-CM

## 2019-07-11 DIAGNOSIS — E11.9: ICD-10-CM

## 2019-07-11 DIAGNOSIS — H57.03: ICD-10-CM

## 2019-07-11 PROCEDURE — V2788 PRESBYOPIA-CORRECT FUNCTION: HCPCS

## 2019-07-11 PROCEDURE — 82962 GLUCOSE BLOOD TEST: CPT

## 2019-07-11 PROCEDURE — 66984 XCAPSL CTRC RMVL W/O ECP: CPT

## 2019-07-11 RX ADMIN — TROPICAMIDE PRN DROP: 10 SOLUTION/ DROPS OPHTHALMIC at 11:20

## 2019-07-11 RX ADMIN — CYCLOPENTOLATE HYDROCHLORIDE AND PHENYLEPHRINE HYDROCHLORIDE PRN DROP: 2; 10 SOLUTION/ DROPS OPHTHALMIC at 11:30

## 2019-07-11 RX ADMIN — TROPICAMIDE PRN DROP: 10 SOLUTION/ DROPS OPHTHALMIC at 11:30

## 2019-07-11 RX ADMIN — CYCLOPENTOLATE HYDROCHLORIDE AND PHENYLEPHRINE HYDROCHLORIDE PRN DROP: 2; 10 SOLUTION/ DROPS OPHTHALMIC at 11:39

## 2019-07-11 RX ADMIN — BESIFLOXACIN PRN DROP: 6 SUSPENSION OPHTHALMIC at 11:20

## 2019-07-11 RX ADMIN — TETRACAINE HYDROCHLORIDE PRN DROP: 5 SOLUTION OPHTHALMIC at 11:20

## 2019-07-11 RX ADMIN — DORZOLAMIDE HYDROCHLORIDE AND TIMOLOL MALEATE PRN DROP: 20; 5 SOLUTION OPHTHALMIC at 12:10

## 2019-07-11 RX ADMIN — BESIFLOXACIN PRN DROP: 6 SUSPENSION OPHTHALMIC at 12:10

## 2019-07-11 RX ADMIN — TROPICAMIDE PRN DROP: 10 SOLUTION/ DROPS OPHTHALMIC at 11:39

## 2019-07-11 RX ADMIN — TETRACAINE HYDROCHLORIDE PRN DROP: 5 SOLUTION OPHTHALMIC at 11:39

## 2019-07-11 RX ADMIN — DORZOLAMIDE HYDROCHLORIDE AND TIMOLOL MALEATE PRN DROP: 20; 5 SOLUTION OPHTHALMIC at 00:00

## 2019-07-11 RX ADMIN — CYCLOPENTOLATE HYDROCHLORIDE AND PHENYLEPHRINE HYDROCHLORIDE PRN DROP: 2; 10 SOLUTION/ DROPS OPHTHALMIC at 11:20

## 2019-07-11 RX ADMIN — TETRACAINE HYDROCHLORIDE PRN DROP: 5 SOLUTION OPHTHALMIC at 11:45

## 2019-07-11 RX ADMIN — BESIFLOXACIN PRN DROP: 6 SUSPENSION OPHTHALMIC at 00:00

## 2019-07-11 RX ADMIN — BESIFLOXACIN PRN DROP: 6 SUSPENSION OPHTHALMIC at 11:30

## 2019-07-12 NOTE — [facility] DISCHARGE SUMMARY E
[facility] Discharge Summary



NAME: NATE PERES

                                      MRN: [id number]

                                AGE: 73Y

ADMITTED: 07/11/2019           DISCHARGED: 07/11/2019



FINAL DIAGNOSIS:

CATARACT, RIGHT EYE.



HISTORY/CLINIC COURSE:

This is a 73kyearkold female who underwent cataract extraction of right eye

with insertion of a Symfony Toric IOL.  She underwent surgery because she

was having difficulty with imbalance between both eyes since having surgery

done in the left eye.  She is to be on a regular diet.  No bending at the

waist, no heavy lifting.  Patient should use her Besivance, Ilevro, and

Durezol at 3 p.m. and 8 p.m., and sleep with a rigid shield.  I will see

her for 1 day postoperative tomorrow.











DICTATING PHYSICIAN: JOSÉ MIGUEL PALMER M.D.



[id number]              DT: 07/12/2019 1127

PHY#: [id number]         DD: 07/12/2019 1116

ID:   [id number]               JOB#: [id number]       ACCT: [id number]



cc:JOSÉ MIGUEL PALMER M.D.

>

## 2019-07-12 NOTE — [facility] OPERATIVE REPORT E
[facility] Operative Report



NAME: NATE PERES

                                      MRN: [id number]

                             AGE: 73Y

DATE OF SURGERY: 07/11/2019          ROOM:





 

PREOPERATIVE DIAGNOSIS:

CATARACT, RIGHT EYE.



POSTOPERATIVE DIAGNOSIS:

CATARACT, RIGHT EYE.



OPERATION:

Cataract extraction with insertion of a Symfony Toric IOL of the right eye.



SURGEON:

JOSÉ MIGUEL PALMER M.D.



ANESTHESIA:

Topical.



PROCEDURE:

After obtaining appropriate consent, the patient's right eye was prepped

and draped in sterile fashion as well as the surgeon in a sterile manner

and cataract surgery was started.  First a paracentesis blade was used to

make a side-port incision.  Viscoelastic was used to inflate the anterior

chamber.  Next a 2.4 mm incision was made with a 2.4 mm blade, clear

corneal temporally.  A continuous capsulorrhexis was made using a cystotome

and Utrata forceps.  Following this hydrodissection was carried out to make

the lens fully loose and mobile and it was rotated 90 degrees.  Following

this, a divide-and-conquer technique was used to phacoemulsify the lens

with a CDE of 7.99. The remaining cortex was removed with

irrigation/aspiration.  Provisc was instilled into the capsular bag to

inflate the bag.  A KHQ163, 22.0 diopter lens was placed, rotated to 165

degrees.  The remaining viscoelastic material was removed with

irrigation/aspiration.  Following this, the incision was found to be

watertight.  Besivance was instilled into the eye and a protective shield

was placed over the eye.   The patient returned to the postoperative

recovery in stable condition.







DICTATING PHYSICIAN: JOSÉ MIGUEL PALMER M.D.



[id number]              DT: 07/12/2019 1126

PHY#: [id number]         DD: 07/12/2019 1116

ID:   [id number]               JOB#: [id number]       ACCT: [id number]



cc:JOSÉ MIGUEL PALMER M.D.

>

## 2020-08-02 ENCOUNTER — HOSPITAL ENCOUNTER (EMERGENCY)
Dept: HOSPITAL 62 - ER | Age: 75
Discharge: HOME | End: 2020-08-02
Payer: MEDICARE

## 2020-08-02 VITALS — DIASTOLIC BLOOD PRESSURE: 93 MMHG | SYSTOLIC BLOOD PRESSURE: 167 MMHG
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
## 2020-08-02 DIAGNOSIS — Z79.899: ICD-10-CM

## 2020-08-02 DIAGNOSIS — Z98.84: ICD-10-CM

## 2020-08-02 DIAGNOSIS — Z87.891: ICD-10-CM

## 2020-08-02 DIAGNOSIS — E11.9: ICD-10-CM

## 2020-08-02 DIAGNOSIS — S49.91XA: Primary | ICD-10-CM

## 2020-08-02 DIAGNOSIS — E89.0: ICD-10-CM

## 2020-08-02 DIAGNOSIS — J44.9: ICD-10-CM

## 2020-08-02 PROCEDURE — 99283 EMERGENCY DEPT VISIT LOW MDM: CPT

## 2020-08-02 PROCEDURE — 73030 X-RAY EXAM OF SHOULDER: CPT

## 2020-08-02 NOTE — ER DOCUMENT REPORT
HPI





- HPI


Time Seen by Provider: 08/02/20 10:28


Pain Level: 4


Notes: 





CHIEF COMPLAINT: Right shoulder injury





HPI: 75-year-old female presenting with right shoulder injury.  Patient states 

that she began having pain in the right shoulder with movement approximately a 

week ago no specific injury.  Saw her primary care provider 3 days ago was 

diagnosed with possible tendinitis prescribed anti-inflammatories and exercises.

 States she did have an x-ray at that time which was normal per her doctor.  

Last night she went to pick something up and felt something "pop or snap" in the

right shoulder now with increased pain with any elevation or abduction of the 

arm.  Denies chest pain.  Denies shortness of breath.  Denies numbness or 

tingling in the extremity.





ROS: See HPI - all other systems were reviewed and are otherwise negative


Constitutional: no fever 


Cardiovascular:  no chest pain 


Resp: no SOB, no cough


Musculoskeletal: + extremity pain or swelling 


Neurological: no numbness/tingling





MEDICATIONS: I agree with the patient medications as charted by the RN.





ALLERGIES: I agree with the allergies as charted by the RN.





PAST MEDICAL HISTORY/PAST SURGICAL HISTORY: Reviewed and agree as charted by RN.





SOCIAL HISTORY: Reviewed and agree as charted by RN.





FAMILY HISTORY: No significant familial comorbid conditions directly related to 

patient complaint





EXAM:


Reviewed vital signs as charted by RN.


CONSTITUTIONAL: Alert and oriented and responds appropriately to questions. 

Well-appearing; well-nourished


HEAD: Normocephalic; atraumatic


EYES: Conjunctivae clear, sclerae non-icteric


ENT: normal nose; no rhinorrhea; moist mucous membranes


NECK: Supple without meningismus; non-tender; no cervical lymphadenopathy, no 

masses


CARD:  symmetric distal pulses


RESP: Normal chest excursion without splinting or tachypnea 


ABD/GI: non-distended.


BACK:  The back appears normal 


EXT: There is limited abduction and rotation of the right arm at the shoulder 

secondary to complaints of pain in the deltoid region.  There is tenderness on 

palpation in this region.  There is no visible swelling.  No visible step-off or

dislocation clinically.  Brachial radial and ulnar pulses are present in the 

right upper extremity.   are equal with strength 5/5.  Sensation is intact 

in the distal fingertips with capillary refill less than 3 seconds   


SKIN: Normal color for age and race; warm; dry; good turgor; no acute lesions 

noted


NEURO: Motor and sensory function intact 


PSYCH: The patient's mood and manner are appropriate. Grooming and personal 

hygiene are appropriate.





MDM: 75-year-old female with injury to the right shoulder.  Patient likely had 

calcific tendinitis and has probably injured this with specific movement today. 

Will obtain x-ray to evaluate for fracture, will likely need sling, pain 

management states she can tolerate hydrocodone well and orthopedic follow-up.  

She follows with Dr. Vazquez in [place]





- REPRODUCTIVE


Reproductive: DENIES: Pregnant:





Past Medical History





- Social History


Smoking Status: Former Smoker


Frequency of alcohol use: None


Drug Abuse: None


Family History: Reviewed & Not Pertinent, COPD, DM





- Past Medical History


Cardiac Medical History: 


   Denies: Hx Coronary Artery Disease, Hx Heart Attack, Hx Hypertension


Pulmonary Medical History: Reports: Hx Asthma, Hx COPD


   Denies: Hx Bronchitis, Hx Pneumonia


Neurological Medical History: Denies: Hx Cerebrovascular Accident, Hx Seizures


Endocrine Medical History: Reports: Hx Diabetes Mellitus Type 2


GI Medical History: Denies: Hx Hepatitis, Hx Hiatal Hernia, Hx Ulcer


Musculoskeletal Medical History: Reports Hx Arthritis


Psychiatric Medical History: Reports: Hx Depression


Infectious Medical History: Denies: Hx Hepatitis


Past Surgical History: Reports: Hx Abdominal Surgery - lap band, bowel 

resection, Hx Appendectomy, Hx Breast Surgery - reduction, Hx Cholecystectomy, 

Hx Hysterectomy, Hx Thyroid Surgery - thyroidectomy.  Denies: Hx Mastectomy, Hx 

Open Heart Surgery, Hx Pacemaker





- Immunizations


Hx Diphtheria, Pertussis, Tetanus Vaccination: Yes - 09/01/13


Hx Pneumococcal Vaccination: 09/01/13





[facility] Provider Document





- INFECTION CONTROL


TRAVEL OUTSIDE OF THE [place] IN LAST 30 DAYS: No





Course





- Re-evaluation


Re-evalutation: 





08/02/20 10:49


X-ray on my review shows degenerative changes no definitive fracture will place 

in a sling for comfort, sling instructions given, follow-up orthopedics





- Vital Signs


Vital signs: 


                                        











Temp Pulse Resp BP Pulse Ox


 


 97.8 F   76   16   167/93 H  99 


 


 08/02/20 09:58  08/02/20 09:58  08/02/20 09:58  08/02/20 09:58  08/02/20 09:58














Procedures





- Immobilization


  ** Right Shoulder


Time completed: 10:52


Pre-Proc Neuro Vasc Exam: Normal


Immobilizer type: Sling


Performed by: PCT


Post-Proc Neuro Vasc Exam: Normal, Unchanged from pre-exam


Alignment checked and good: Yes





Discharge





- Discharge


Clinical Impression: 


Right shoulder injury


Qualifiers:


 Encounter type: initial encounter Qualified Code(s): S49.91XA - Unspecified 

injury of right shoulder and upper arm, initial encounter





Condition: Stable


Disposition: HOME, SELF-CARE


Instructions:  Rotator Cuff Injury ([facility])


Additional Instructions: 


1. ice the shoulder twice daily for 10 minutes each time


2. use the sling for comfort during the day only for 2-3 days.  Do not sleep in 

the sling


3. take the arm out of the sling 3-4 times daily and perform gentle range of 

motion exercises to maintain flexibility in the shoulder


4. medications for pain as directed, watch for balance issues with narcotics


5. follow up with orthopedics for further evaluation and treatment call for appt


Prescriptions: 


Hydrocodone/Acetaminophen [Norco 5-325 mg Tablet] 1 tab PO Q4 PRN #15 tablet


 PRN Reason: 


Referrals: 


ELANA OROURKE MD [Primary Care Provider] - Follow up as needed

## 2020-08-02 NOTE — RADIOLOGY REPORT (SQ)
EXAM DESCRIPTION:  SHOULDER RIGHT 2 OR MORE VIEWS



IMAGES COMPLETED DATE/TIME:  8/2/2020 10:46 am



REASON FOR STUDY:  pain/injury



COMPARISON:  None.



NUMBER OF VIEWS:  Three views.



TECHNIQUE:  Internal rotation, external rotation, and Y view images acquired of the right shoulder.



LIMITATIONS:  None.



FINDINGS:  MINERALIZATION: Normal.

BONES: No acute fracture.  No worrisome bone lesions.

JOINTS: No dislocation.

VISUALIZED LUNGS AND RIBS: No pneumothorax.  No rib fracture.

SOFT TISSUES: No radiopaque foreign body.

OTHER: No other significant finding.



IMPRESSION:  NEGATIVE STUDY OF THE RIGHT SHOULDER. NO RADIOGRAPHIC EVIDENCE OF ACUTE INJURY.



TECHNICAL DOCUMENTATION:  JOB ID:  [id number]

 2011 [employer]- All Rights Reserved



Reading location - IP/workstation name: [email or web address]

## 2020-09-24 ENCOUNTER — HOSPITAL ENCOUNTER (OUTPATIENT)
Dept: HOSPITAL 62 - OD | Age: 75
End: 2020-09-24
Attending: NURSE PRACTITIONER
Payer: MEDICARE

## 2020-09-24 DIAGNOSIS — J44.9: Primary | ICD-10-CM

## 2020-09-24 PROCEDURE — 71046 X-RAY EXAM CHEST 2 VIEWS: CPT

## 2020-09-24 NOTE — RADIOLOGY REPORT (SQ)
EXAM DESCRIPTION:  CHEST PA/LATERAL



IMAGES COMPLETED DATE/TIME:  9/24/2020 1:49 pm



REASON FOR STUDY:  CHRONIC OBSTRUCTIVE PULMONARY DISEASE, UNSPECIFIED



COMPARISON:  2/15/2017.



EXAM PARAMETERS:  NUMBER OF VIEWS: two views

TECHNIQUE: Digital Frontal and Lateral radiographic views of the chest acquired.

RADIATION DOSE: NA

LIMITATIONS: none



FINDINGS:  LUNGS AND PLEURA: No opacities, masses or pneumothorax. No pleural effusion.

MEDIASTINUM AND HILAR STRUCTURES: No masses or contour abnormalities.

HEART AND VASCULAR STRUCTURES: Heart normal size.  No evidence for failure.

BONES: No acute findings.  Degenerative changes in the spine.

HARDWARE: None in the chest.

OTHER: No other significant finding.



IMPRESSION:  NO SIGNIFICANT RADIOGRAPHIC FINDING IN THE CHEST.



TECHNICAL DOCUMENTATION:  JOB ID:  [id number]

 2011 [employer]- All Rights Reserved



Reading location - IP/workstation name: [email or web address]